# Patient Record
Sex: FEMALE | Race: WHITE | NOT HISPANIC OR LATINO | Employment: OTHER | URBAN - METROPOLITAN AREA
[De-identification: names, ages, dates, MRNs, and addresses within clinical notes are randomized per-mention and may not be internally consistent; named-entity substitution may affect disease eponyms.]

---

## 2017-05-05 ENCOUNTER — CONVERSION ENCOUNTER (OUTPATIENT)
Dept: FAMILY MEDICINE CLINIC | Facility: CLINIC | Age: 82
End: 2017-05-05

## 2017-05-05 LAB
ALBUMIN SERPL-MCNC: 3.5 G/DL (ref 3.6–5.1)
ALBUMIN/GLOB SERPL: ABNORMAL {RATIO} (ref 1–2.5)
ALP SERPL-CCNC: 142 UNITS/L (ref 33–130)
ALT SERPL-CCNC: 18 UNITS/L (ref 6–29)
AST SERPL-CCNC: 24 UNITS/L (ref 10–35)
BILIRUB SERPL-MCNC: 0.8 MG/DL (ref 0.2–1.2)
BUN SERPL-MCNC: 23 MG/DL (ref 7–25)
BUN/CREAT SERPL: ABNORMAL (ref 6–22)
CALCIUM SERPL-MCNC: 9.2 MG/DL (ref 8.6–10.4)
CHLORIDE SERPL-SCNC: 102 MMOL/L (ref 98–110)
CHOLEST SERPL-MCNC: 139 MG/DL (ref 125–200)
CHOLEST/HDLC SERPL: ABNORMAL {RATIO}
CO2 CONTENT VENOUS: 28 MMOL/L (ref 20–31)
CONV % HGB A1C: ABNORMAL %
CONV TOTAL PROTEIN: 6.6 G/DL (ref 6.1–8.1)
CREAT UR-MCNC: 0.75 MG/DL (ref 0.6–0.88)
GLOBULIN UR ELPH-MCNC: ABNORMAL G/DL (ref 1.9–3.7)
GLUCOSE SERPL-MCNC: 108 MG/DL (ref 65–99)
HDLC SERPL-MCNC: 50 MG/DL
LDLC SERPL CALC-MCNC: ABNORMAL MG/DL
POTASSIUM SERPL-SCNC: 3.7 MMOL/L (ref 3.5–5.3)
SODIUM SERPL-SCNC: 140 MMOL/L (ref 135–146)
TRIGL SERPL-MCNC: 80 MG/DL

## 2017-06-12 ENCOUNTER — CONVERSION ENCOUNTER (OUTPATIENT)
Dept: FAMILY MEDICINE CLINIC | Facility: CLINIC | Age: 82
End: 2017-06-12

## 2017-12-02 ENCOUNTER — HOSPITAL ENCOUNTER (OUTPATIENT)
Dept: URGENT CARE | Facility: CLINIC | Age: 82
Setting detail: SPECIMEN
Discharge: HOME | End: 2017-12-02
Attending: FAMILY MEDICINE | Admitting: FAMILY MEDICINE

## 2017-12-02 LAB
AMPICILLIN SUSC ISLT: ABNORMAL
AZTREONAM SUSC ISLT: ABNORMAL
BACTERIA ISLT: ABNORMAL
BACTERIA SPEC AEROBE CULT: ABNORMAL
CEFAZOLIN SUSC ISLT: ABNORMAL
CEFEPIME SUSC ISLT: ABNORMAL
CEFTRIAXONE SUSC ISLT: ABNORMAL
CIPROFLOXACIN SUSC ISLT: ABNORMAL
COLONY COUNT: ABNORMAL
ERTAPENEM SUSC ISLT: ABNORMAL
LEVOFLOXACIN SUSC ISLT: ABNORMAL
Lab: ABNORMAL
MEROPENEM SUSC ISLT: ABNORMAL
MICRO REPORT STATUS: ABNORMAL
NITROFURANTOIN SUSC ISLT: ABNORMAL
PIP+TAZO SUSC ISLT: ABNORMAL
SPECIMEN SOURCE: ABNORMAL
SUSC METH SPEC: ABNORMAL
TETRACYCLINE SUSC ISLT: ABNORMAL
TOBRAMYCIN SUSC ISLT: ABNORMAL
TRIMETHOPRIM/SULFA: ABNORMAL

## 2018-11-17 ENCOUNTER — HOSPITAL ENCOUNTER (OUTPATIENT)
Dept: URGENT CARE | Facility: CLINIC | Age: 83
Setting detail: SPECIMEN
Discharge: HOME | End: 2018-11-17
Attending: FAMILY MEDICINE | Admitting: FAMILY MEDICINE

## 2018-11-17 LAB
AMPICILLIN SUSC ISLT: NORMAL
AZTREONAM SUSC ISLT: NORMAL
BACTERIA ISLT: NORMAL
BACTERIA SPEC AEROBE CULT: NORMAL
CEFAZOLIN SUSC ISLT: NORMAL
CEFEPIME SUSC ISLT: NORMAL
CEFTRIAXONE SUSC ISLT: NORMAL
CIPROFLOXACIN SUSC ISLT: NORMAL
COLONY COUNT: NORMAL
LEVOFLOXACIN SUSC ISLT: NORMAL
Lab: NORMAL
MEROPENEM SUSC ISLT: NORMAL
MICRO REPORT STATUS: NORMAL
NITROFURANTOIN SUSC ISLT: NORMAL
PIP+TAZO SUSC ISLT: NORMAL
SPECIMEN SOURCE: NORMAL
SUSC METH SPEC: NORMAL
TETRACYCLINE SUSC ISLT: NORMAL
TOBRAMYCIN SUSC ISLT: NORMAL
TRIMETHOPRIM/SULFA: NORMAL

## 2018-12-04 ENCOUNTER — HOSPITAL ENCOUNTER (OUTPATIENT)
Dept: FAMILY MEDICINE CLINIC | Facility: CLINIC | Age: 83
Setting detail: SPECIMEN
Discharge: HOME | End: 2018-12-04
Attending: FAMILY MEDICINE | Admitting: FAMILY MEDICINE

## 2018-12-04 LAB
ALBUMIN SERPL-MCNC: 3.4 G/DL (ref 3.5–4.8)
ALBUMIN/GLOB SERPL: 1.1 {RATIO} (ref 1–1.7)
ALP SERPL-CCNC: 95 IU/L (ref 32–91)
ALT SERPL-CCNC: 17 IU/L (ref 14–54)
ANION GAP SERPL CALC-SCNC: 10.7 MMOL/L (ref 10–20)
AST SERPL-CCNC: 26 IU/L (ref 15–41)
BILIRUB SERPL-MCNC: 1 MG/DL (ref 0.3–1.2)
BUN SERPL-MCNC: 21 MG/DL (ref 8–20)
BUN/CREAT SERPL: 21 (ref 5.4–26.2)
CALCIUM SERPL-MCNC: 8.8 MG/DL (ref 8.9–10.3)
CHLORIDE SERPL-SCNC: 96 MMOL/L (ref 101–111)
CHOLEST SERPL-MCNC: 146 MG/DL
CHOLEST/HDLC SERPL: 2.7 {RATIO}
CONV CO2: 30 MMOL/L (ref 22–32)
CONV LDL CHOLESTEROL DIRECT: 78 MG/DL (ref 0–100)
CONV TOTAL PROTEIN: 6.5 G/DL (ref 6.1–7.9)
CREAT UR-MCNC: 1 MG/DL (ref 0.4–1)
GLOBULIN UR ELPH-MCNC: 3.1 G/DL (ref 2.5–3.8)
GLUCOSE SERPL-MCNC: 100 MG/DL (ref 65–99)
HBA1C MFR BLD: 5.1 % (ref 0–5.6)
HDLC SERPL-MCNC: 53 MG/DL
LDLC/HDLC SERPL: 1.5 {RATIO}
LIPID INTERPRETATION: NORMAL
POTASSIUM SERPL-SCNC: 3.7 MMOL/L (ref 3.6–5.1)
SODIUM SERPL-SCNC: 133 MMOL/L (ref 136–144)
TRIGL SERPL-MCNC: 72 MG/DL
VLDLC SERPL CALC-MCNC: 15.2 MG/DL

## 2019-04-16 ENCOUNTER — HOSPITAL ENCOUNTER (OUTPATIENT)
Dept: URGENT CARE | Facility: CLINIC | Age: 84
Discharge: HOME | End: 2019-04-16
Attending: FAMILY MEDICINE | Admitting: FAMILY MEDICINE

## 2019-05-01 ENCOUNTER — CONVERSION ENCOUNTER (OUTPATIENT)
Dept: FAMILY MEDICINE CLINIC | Facility: CLINIC | Age: 84
End: 2019-05-01

## 2019-05-01 ENCOUNTER — HOSPITAL ENCOUNTER (OUTPATIENT)
Dept: FAMILY MEDICINE CLINIC | Facility: CLINIC | Age: 84
Setting detail: SPECIMEN
Discharge: HOME | End: 2019-05-01
Attending: FAMILY MEDICINE | Admitting: FAMILY MEDICINE

## 2019-05-09 ENCOUNTER — HOSPITAL ENCOUNTER (OUTPATIENT)
Dept: FAMILY MEDICINE CLINIC | Facility: CLINIC | Age: 84
Setting detail: SPECIMEN
Discharge: HOME | End: 2019-05-09
Attending: FAMILY MEDICINE | Admitting: FAMILY MEDICINE

## 2019-05-09 LAB
ANION GAP SERPL CALC-SCNC: 11.8 MMOL/L (ref 10–20)
BUN SERPL-MCNC: 22 MG/DL (ref 8–20)
BUN/CREAT SERPL: 24.4 (ref 5.4–26.2)
CALCIUM SERPL-MCNC: 9.3 MG/DL (ref 8.9–10.3)
CHLORIDE SERPL-SCNC: 101 MMOL/L (ref 101–111)
CONV CO2: 29 MMOL/L (ref 22–32)
CREAT UR-MCNC: 0.9 MG/DL (ref 0.4–1)
GLUCOSE SERPL-MCNC: 147 MG/DL (ref 65–99)
HBA1C MFR BLD: 5 % (ref 0–5.6)
POTASSIUM SERPL-SCNC: 3.8 MMOL/L (ref 3.6–5.1)
SODIUM SERPL-SCNC: 138 MMOL/L (ref 136–144)

## 2019-06-03 VITALS
BODY MASS INDEX: 17.38 KG/M2 | OXYGEN SATURATION: 98 % | HEART RATE: 83 BPM | HEART RATE: 79 BPM | DIASTOLIC BLOOD PRESSURE: 93 MMHG | DIASTOLIC BLOOD PRESSURE: 91 MMHG | SYSTOLIC BLOOD PRESSURE: 155 MMHG | SYSTOLIC BLOOD PRESSURE: 152 MMHG | WEIGHT: 111 LBS

## 2019-10-17 PROCEDURE — 87086 URINE CULTURE/COLONY COUNT: CPT | Performed by: EMERGENCY MEDICINE

## 2019-10-18 ENCOUNTER — TELEPHONE (OUTPATIENT)
Dept: URGENT CARE | Facility: CLINIC | Age: 84
End: 2019-10-18

## 2019-10-18 PROBLEM — I10 HYPERTENSION: Status: ACTIVE | Noted: 2019-10-18

## 2019-10-18 PROBLEM — N30.90 CYSTITIS: Status: ACTIVE | Noted: 2018-11-17

## 2019-10-18 PROBLEM — R73.9 HYPERGLYCEMIA: Status: ACTIVE | Noted: 2017-05-02

## 2019-10-18 PROBLEM — E87.6 HYPOKALEMIA: Status: ACTIVE | Noted: 2019-05-02

## 2019-10-18 PROBLEM — E78.5 HYPERLIPIDEMIA: Status: ACTIVE | Noted: 2019-10-18

## 2019-10-18 PROBLEM — M19.90 OSTEOARTHRITIS: Status: ACTIVE | Noted: 2018-12-04

## 2019-10-18 PROBLEM — R63.6 UNDERWEIGHT: Status: ACTIVE | Noted: 2018-12-04

## 2019-10-18 PROBLEM — S22.42XA MULTIPLE CLOSED FRACTURES OF RIBS OF LEFT SIDE: Status: ACTIVE | Noted: 2019-04-16

## 2019-11-07 ENCOUNTER — OFFICE VISIT (OUTPATIENT)
Dept: FAMILY MEDICINE CLINIC | Facility: CLINIC | Age: 84
End: 2019-11-07

## 2019-11-07 VITALS
TEMPERATURE: 97.8 F | WEIGHT: 110 LBS | HEART RATE: 85 BPM | OXYGEN SATURATION: 97 % | SYSTOLIC BLOOD PRESSURE: 145 MMHG | BODY MASS INDEX: 17.68 KG/M2 | DIASTOLIC BLOOD PRESSURE: 84 MMHG | HEIGHT: 66 IN

## 2019-11-07 DIAGNOSIS — N30.01 ACUTE CYSTITIS WITH HEMATURIA: ICD-10-CM

## 2019-11-07 DIAGNOSIS — I10 ESSENTIAL HYPERTENSION: ICD-10-CM

## 2019-11-07 DIAGNOSIS — R63.6 UNDERWEIGHT: ICD-10-CM

## 2019-11-07 DIAGNOSIS — E78.2 MIXED HYPERLIPIDEMIA: ICD-10-CM

## 2019-11-07 DIAGNOSIS — Z00.00 PREVENTATIVE HEALTH CARE: Primary | ICD-10-CM

## 2019-11-07 DIAGNOSIS — M24.541 CONTRACTURE OF FINGER JOINT, RIGHT: ICD-10-CM

## 2019-11-07 LAB
BILIRUB BLD-MCNC: NEGATIVE MG/DL
CLARITY, POC: CLEAR
COLOR UR: YELLOW
GLUCOSE UR STRIP-MCNC: NEGATIVE MG/DL
KETONES UR QL: NEGATIVE
LEUKOCYTE EST, POC: ABNORMAL
NITRITE UR-MCNC: POSITIVE MG/ML
PH UR: 8.5 [PH] (ref 5–8)
PROT UR STRIP-MCNC: ABNORMAL MG/DL
RBC # UR STRIP: ABNORMAL /UL
SP GR UR: 1.02 (ref 1–1.03)
UROBILINOGEN UR QL: NORMAL

## 2019-11-07 PROCEDURE — 87086 URINE CULTURE/COLONY COUNT: CPT | Performed by: FAMILY MEDICINE

## 2019-11-07 PROCEDURE — 99213 OFFICE O/P EST LOW 20 MIN: CPT | Performed by: FAMILY MEDICINE

## 2019-11-07 PROCEDURE — 87077 CULTURE AEROBIC IDENTIFY: CPT | Performed by: FAMILY MEDICINE

## 2019-11-07 PROCEDURE — 96160 PT-FOCUSED HLTH RISK ASSMT: CPT | Performed by: FAMILY MEDICINE

## 2019-11-07 PROCEDURE — G0439 PPPS, SUBSEQ VISIT: HCPCS | Performed by: FAMILY MEDICINE

## 2019-11-07 PROCEDURE — 87186 SC STD MICRODIL/AGAR DIL: CPT | Performed by: FAMILY MEDICINE

## 2019-11-07 PROCEDURE — 81003 URINALYSIS AUTO W/O SCOPE: CPT | Performed by: FAMILY MEDICINE

## 2019-11-07 RX ORDER — SULFAMETHOXAZOLE AND TRIMETHOPRIM 800; 160 MG/1; MG/1
1 TABLET ORAL 2 TIMES DAILY
Qty: 20 TABLET | Refills: 0 | Status: SHIPPED | OUTPATIENT
Start: 2019-11-07

## 2019-11-07 NOTE — PATIENT INSTRUCTIONS
Try eating several small meals throughout the day  Consider nutritional drinks like: ENSURE, boost, or carnation instant breakfast

## 2019-11-07 NOTE — PROGRESS NOTES
The ABCs of the Annual Wellness Visit  Subsequent Medicare Wellness Visit    Chief Complaint   Patient presents with   • Medicare Wellness-Initial Visit       Subjective   History of Present Illness:  Dawn Hdz is a 89 y.o. female who presents for a Subsequent Medicare Wellness Visit.  Also here for follow up on bp and chol  Labs earlier this year were good  Colonoscopy was in 2013 - rec was for repeat in 5 only if really needed  Treated recently for uti with cephalexin  Still having freq and dysuria  Has been seeing [facility] for her right hand - bad thumbnail and contracture of the 5th finger  Has pain assoc with these and it makes it difficult to open jars etc  Refuses vaccines          HEALTH RISK ASSESSMENT    Recent Hospitalizations:  No hospitalization(s) within the last year.    Current Medical Providers:  Patient Care Team:  Evelina Chawla MD as PCP - General    Smoking Status:  Social History     Tobacco Use   Smoking Status Not on file       Alcohol Consumption:  Social History     Substance and Sexual Activity   Alcohol Use Not on file       Depression Screen:   PHQ-2/PHQ-9 Depression Screening 11/7/2019   Little interest or pleasure in doing things 0   Feeling down, depressed, or hopeless 0   Total Score 0       Fall Risk Screen:  STEADI Fall Risk Assessment was completed, and patient is at HIGH risk for falls. Assessment completed on:11/7/2019    Health Habits and Functional and Cognitive Screening:  Functional & Cognitive Status 11/7/2019   Do you have difficulty preparing food and eating? No   Do you have difficulty bathing yourself, getting dressed or grooming yourself? No   Do you have difficulty using the toilet? No   Do you have difficulty moving around from place to place? No   Do you have trouble with steps or getting out of a bed or a chair? No   Current Diet Well Balanced Diet   Dental Exam Up to date   Eye Exam Up to date   Exercise (times per week) 3 times per week    Current Exercise Activities Include Walking   Do you need help using the phone?  No   Are you deaf or do you have serious difficulty hearing?  Yes   Do you need help with transportation? No   Do you need help shopping? No   Do you need help preparing meals?  No   Do you need help with housework?  No   Do you need help with laundry? No   Do you need help taking your medications? No   Do you need help managing money? No   Do you ever drive or ride in a car without wearing a seat belt? No   Have you felt unusual stress, anger or loneliness in the last month? No   Who do you live with? Other   If you need help, do you have trouble finding someone available to you? Yes   Have you been bothered in the last four weeks by sexual problems? No   Do you have difficulty concentrating, remembering or making decisions? No         Does the patient have evidence of cognitive impairment? No   MMSE done 29/30 - result scanned into chart    Asprin use counseling:Does not need ASA (and currently is not on it)    Age-appropriate Screening Schedule:  Refer to the list below for future screening recommendations based on patient's age, sex and/or medical conditions. Orders for these recommended tests are listed in the plan section. The patient has been provided with a written plan.    Health Maintenance   Topic Date Due   • TDAP/TD VACCINES (1 - Tdap) 02/19/1948   • LIPID PANEL  05/01/2020   • INFLUENZA VACCINE  Discontinued   • PNEUMOCOCCAL VACCINES (65+ LOW/MEDIUM RISK)  Discontinued   • ZOSTER VACCINE  Discontinued          The following portions of the patient's history were reviewed and updated as appropriate: allergies, current medications, past family history, past medical history, past social history, past surgical history and problem list.    Outpatient Medications Prior to Visit   Medication Sig Dispense Refill   • atenolol (TENORMIN) 25 MG tablet ATENOLOL 25 MG TABS     • cephalexin (KEFLEX) 500 MG capsule Take 1 capsule by  "mouth 3 (Three) Times a Day. 21 capsule 0   • lisinopril-hydrochlorothiazide (PRINZIDE,ZESTORETIC) 20-25 MG per tablet LISINOPRIL-HYDROCHLOROTHIAZIDE 20-25 MG TABS     • potassium chloride (K-DUR,KLOR-CON) 20 MEQ CR tablet Daily.       No facility-administered medications prior to visit.        Patient Active Problem List   Diagnosis   • Cystitis   • Difficult or painful urination   • Fatigue   • History of rectal cancer   • Hyperglycemia   • Hyperlipidemia   • Hypertension   • Hypokalemia   • Multiple closed fractures of ribs of left side   • Osteoarthritis   • Pain in shoulder   • Underweight   • Preventative health care       Advanced Care Planning:  Patient has an advance directive - a copy has not been provided. Have asked the patient to send this to us to add to record    Review of Systems   Constitutional: Negative.    Respiratory: Negative.    Cardiovascular: Negative.    Gastrointestinal: Negative for abdominal pain, constipation, diarrhea, nausea and vomiting.   Endocrine: Negative.    Genitourinary: Positive for dysuria, frequency and urgency. Negative for hematuria.   Musculoskeletal: Positive for arthralgias.   Neurological: Negative for dizziness, syncope, light-headedness and headaches.       Compared to one year ago, the patient feels her physical health is the same.  Compared to one year ago, the patient feels her mental health is worse.    Reviewed chart for potential of high risk medication in the elderly: yes  Reviewed chart for potential of harmful drug interactions in the elderly:yes    Objective         Vitals:    11/07/19 1215 11/07/19 1254   BP: (!) 225/105 145/84   BP Location: Left arm    Patient Position: Sitting    Cuff Size: Adult    Pulse: 85    Temp: 97.8 °F (36.6 °C)    TempSrc: Oral    SpO2: 97%    Weight: 49.9 kg (110 lb)    Height: 167.6 cm (66\")        Body mass index is 17.75 kg/m².  Discussed the patient's BMI with her. The BMI is below average; BMI management plan is " completed.    Physical Exam   Constitutional: She is oriented to person, place, and time. She appears well-developed and well-nourished.   Neck: No JVD present.   Cardiovascular: Normal rate, regular rhythm and normal heart sounds. Exam reveals no gallop and no friction rub.   No murmur heard.  Pulmonary/Chest: Effort normal and breath sounds normal. No respiratory distress. She has no wheezes. She has no rales.   Abdominal: Soft. Bowel sounds are normal. There is no tenderness.   Musculoskeletal:   Contracture extending across the palm of her right hand and involving the 5th finger   Lymphadenopathy:     She has no cervical adenopathy.   Neurological: She is alert and oriented to person, place, and time.   Skin: Skin is warm and dry. No rash noted.   Thick dysmorphic right thumbnail     Psychiatric: Her mood appears anxious.   Nursing note and vitals reviewed.          Brief Urine Lab Results  (Last result in the past 365 days)      Color   Clarity   Blood   Leuk Est   Nitrite   Protein   CREAT   Urine HCG        11/07/19 1347 Yellow Clear Moderate Large (3+) Positive 100 mg/dL               Assessment/Plan   Medicare Risks and Personalized Health Plan  CMS Preventative Services Quick Reference  Advance Directive Discussion    The above risks/problems have been discussed with the patient.  Pertinent information has been shared with the patient in the After Visit Summary.  Follow up plans and orders are seen below in the Assessment/Plan Section.    Diagnoses and all orders for this visit:    1. Preventative health care (Primary)    2. Acute cystitis with hematuria  -     POCT urinalysis dipstick, automated  -     Urine Culture - Urine, Urine, Clean Catch    3. Essential hypertension    4. Mixed hyperlipidemia    5. Underweight    6. Contracture of finger joint, right    Other orders  -     sulfamethoxazole-trimethoprim (BACTRIM DS) 800-160 MG per tablet; Take 1 tablet by mouth 2 (Two) Times a Day.  Dispense: 20  tablet; Refill: 0      Follow Up:  Return in about 6 months (around 5/7/2020) for Recheck.     An After Visit Summary and PPPS were given to the patient.       She no longer needs colonoscopy or mammogram; she refuses all vaccines; she will give us a copy of her advanced directives  Labs earlier this year were good; will repeat in 6 mo  She was encouraged to eat several small meals a day; she says she has a good appetite but it she tries to eat very much, she will need to have an urgent BM (h/o prev colon surgery as well as cholecytstectomy)  bp is doing well  Initially elevated but improved on repeat  She will continue to [facility] and [facility]  Will send urine for culture and start her on bactrim as she says she has done well with this in the past

## 2019-11-09 LAB — BACTERIA SPEC AEROBE CULT: ABNORMAL
